# Patient Record
Sex: MALE | Race: WHITE | NOT HISPANIC OR LATINO | URBAN - METROPOLITAN AREA
[De-identification: names, ages, dates, MRNs, and addresses within clinical notes are randomized per-mention and may not be internally consistent; named-entity substitution may affect disease eponyms.]

---

## 2021-08-09 ENCOUNTER — EMERGENCY (EMERGENCY)
Facility: HOSPITAL | Age: 61
LOS: 1 days | Discharge: HOME | End: 2021-08-09
Attending: EMERGENCY MEDICINE | Admitting: EMERGENCY MEDICINE
Payer: COMMERCIAL

## 2021-08-09 VITALS
SYSTOLIC BLOOD PRESSURE: 140 MMHG | RESPIRATION RATE: 18 BRPM | DIASTOLIC BLOOD PRESSURE: 93 MMHG | OXYGEN SATURATION: 99 % | TEMPERATURE: 98 F | HEART RATE: 79 BPM

## 2021-08-09 VITALS
OXYGEN SATURATION: 99 % | WEIGHT: 175.05 LBS | HEART RATE: 92 BPM | RESPIRATION RATE: 17 BRPM | DIASTOLIC BLOOD PRESSURE: 90 MMHG | HEIGHT: 70 IN | TEMPERATURE: 98 F | SYSTOLIC BLOOD PRESSURE: 143 MMHG

## 2021-08-09 DIAGNOSIS — A09 INFECTIOUS GASTROENTERITIS AND COLITIS, UNSPECIFIED: ICD-10-CM

## 2021-08-09 DIAGNOSIS — R19.7 DIARRHEA, UNSPECIFIED: ICD-10-CM

## 2021-08-09 DIAGNOSIS — Z20.822 CONTACT WITH AND (SUSPECTED) EXPOSURE TO COVID-19: ICD-10-CM

## 2021-08-09 DIAGNOSIS — E78.5 HYPERLIPIDEMIA, UNSPECIFIED: ICD-10-CM

## 2021-08-09 LAB
FLUAV H1 2009 PAND RNA SPEC QL NAA+PROBE: SIGNIFICANT CHANGE UP
FLUAV H1 RNA SPEC QL NAA+PROBE: SIGNIFICANT CHANGE UP
FLUAV H3 RNA SPEC QL NAA+PROBE: SIGNIFICANT CHANGE UP
FLUAV SUBTYP SPEC NAA+PROBE: SIGNIFICANT CHANGE UP
FLUBV RNA SPEC QL NAA+PROBE: SIGNIFICANT CHANGE UP
RAPID RVP RESULT: SIGNIFICANT CHANGE UP
SARS-COV-2 RNA SPEC QL NAA+PROBE: SIGNIFICANT CHANGE UP

## 2021-08-09 PROCEDURE — 99284 EMERGENCY DEPT VISIT MOD MDM: CPT

## 2021-08-09 RX ORDER — METRONIDAZOLE 500 MG
1 TABLET ORAL
Qty: 15 | Refills: 0
Start: 2021-08-09 | End: 2021-08-13

## 2021-08-09 RX ORDER — AZITHROMYCIN 500 MG/1
500 TABLET, FILM COATED ORAL ONCE
Refills: 0 | Status: COMPLETED | OUTPATIENT
Start: 2021-08-09 | End: 2021-08-09

## 2021-08-09 RX ORDER — TINIDAZOLE 500 MG
4 TABLET ORAL
Qty: 4 | Refills: 0
Start: 2021-08-09

## 2021-08-09 RX ORDER — AZITHROMYCIN 500 MG/1
2 TABLET, FILM COATED ORAL
Qty: 8 | Refills: 0
Start: 2021-08-09 | End: 2021-08-12

## 2021-08-09 RX ORDER — METRONIDAZOLE 500 MG
500 TABLET ORAL ONCE
Refills: 0 | Status: COMPLETED | OUTPATIENT
Start: 2021-08-09 | End: 2021-08-09

## 2021-08-09 RX ORDER — SACCHAROMYCES BOULARDII 250 MG
1 POWDER IN PACKET (EA) ORAL
Qty: 30 | Refills: 0
Start: 2021-08-09 | End: 2021-09-07

## 2021-08-09 RX ADMIN — Medication 500 MILLIGRAM(S): at 16:44

## 2021-08-09 RX ADMIN — AZITHROMYCIN 500 MILLIGRAM(S): 500 TABLET, FILM COATED ORAL at 16:43

## 2021-08-09 NOTE — ED ADULT TRIAGE NOTE - CHIEF COMPLAINT QUOTE
Pt walked into ed c.o intermittent diarrhea and bloating x 4 days. Denies blood in stool,fever at this time.

## 2021-08-09 NOTE — ED PROVIDER NOTE - NSFOLLOWUPINSTRUCTIONS_ED_ALL_ED_FT
Diarrhea (bacterial vs. parasitic vs. viral)     Please start the antibiotics, we may stop them as the test results come in.     Start a probiotic and follow bland, easy to digest diet.     Diarrhea is frequent loose or watery bowel movements that has many causes. Diarrhea can make you feel weak and cause you to become dehydrated. Diarrhea typically lasts 2–3 days, but can last longer if it is a sign of something more serious. Drink clear fluids to prevent dehydration. Eat bland, easy-to-digest foods as tolerated.     SEEK IMMEDIATE MEDICAL CARE IF YOU HAVE ANY OF THE FOLLOWING SYMPTOMS: high fevers, lightheadedness/dizziness, chest pain, black or bloody stools, shortness of breath, severe abdominal or back pain, or any signs of dehydration.

## 2021-08-09 NOTE — ED PROVIDER NOTE - PATIENT PORTAL LINK FT
You can access the FollowMyHealth Patient Portal offered by [facility] by registering at the following website: [email or web address]. By joining [facility]’s FollowMyHealth portal, you will also be able to view your health information using other applications (apps) compatible with our system.

## 2021-08-09 NOTE — ED POST DISCHARGE NOTE - DETAILS
Patient symptom free + blastocystis hominis. 8/14/21 @ 12:32 pm 8/14/21 @ 1:28 pm Pt called back and informed of result.  He is no longer symptomatic.

## 2021-08-09 NOTE — ED PROVIDER NOTE - CLINICAL SUMMARY MEDICAL DECISION MAKING FREE TEXT BOX
Patient presenting with diarrhea x 3 days. Seems infectious. Will send stool studies as well as RVP as rhinovirus/enterovirus is also circulating. Anticipate discharge on Azithromycin and Flagyl with antibiotics to be deescalated depending on PCR results. Patient is well hydrated and does not need labs, IV fluids, or CT at this time.

## 2021-08-09 NOTE — ED PROVIDER NOTE - OBJECTIVE STATEMENT
62 y/o male presents to the ED with complaints of 3 days of watery diarrhea a few times a day associated with bloating and a grumbling stomach. Symptoms started at approximately 3:0am early Saturday morning. Patient had gone out for dinner with other people and was the only person who ate eggplant parmigiana at the restaurant. Finished eating around 11:30pm, and symptoms started a few hours later. 62 y/o male presents to the ED with complaints of 3 days of watery diarrhea a few times a day associated with bloating and a grumbling stomach. Symptoms started at approximately 3:00am early Saturday morning. Patient had gone out for dinner with other people and was the only person who ate eggplant parmigiana at the restaurant. Finished eating around 11:30pm, and symptoms started a few hours later. Denies fever, chills, N/V, or abdominal pain. Patient called his GI doctor who advised to come to the ED due to his history of diverticulosis. Patient and his  are both monogamous. Patient's partner does not have any symptoms. Both had COVID last year and are fully vaccinated. No sick contacts. No other suspicious food history. Has not taken anything for symptoms. Patient has been keeping up with his hydration and eating and is following a bland diet.

## 2021-08-14 ENCOUNTER — TRANSCRIPTION ENCOUNTER (OUTPATIENT)
Age: 61
End: 2021-08-14

## 2021-08-14 LAB
CULTURE RESULTS: SIGNIFICANT CHANGE UP
SPECIMEN SOURCE: SIGNIFICANT CHANGE UP

## 2023-07-13 ENCOUNTER — TRANSCRIPTION ENCOUNTER (OUTPATIENT)
Age: 63
End: 2023-07-13

## 2023-07-13 ENCOUNTER — INPATIENT (INPATIENT)
Facility: HOSPITAL | Age: 63
LOS: 0 days | Discharge: HOME | DRG: 346 | End: 2023-07-14
Attending: STUDENT IN AN ORGANIZED HEALTH CARE EDUCATION/TRAINING PROGRAM | Admitting: STUDENT IN AN ORGANIZED HEALTH CARE EDUCATION/TRAINING PROGRAM
Payer: COMMERCIAL

## 2023-07-13 ENCOUNTER — EMERGENCY (EMERGENCY)
Facility: HOSPITAL | Age: 63
LOS: 1 days | Discharge: ANOTHER HOSPITAL | End: 2023-07-13
Attending: EMERGENCY MEDICINE | Admitting: EMERGENCY MEDICINE
Payer: COMMERCIAL

## 2023-07-13 VITALS
RESPIRATION RATE: 18 BRPM | HEIGHT: 70 IN | DIASTOLIC BLOOD PRESSURE: 88 MMHG | WEIGHT: 175.05 LBS | HEART RATE: 89 BPM | TEMPERATURE: 100 F | OXYGEN SATURATION: 98 % | SYSTOLIC BLOOD PRESSURE: 142 MMHG

## 2023-07-13 VITALS
HEIGHT: 70 IN | SYSTOLIC BLOOD PRESSURE: 145 MMHG | TEMPERATURE: 100 F | DIASTOLIC BLOOD PRESSURE: 94 MMHG | HEART RATE: 92 BPM | OXYGEN SATURATION: 99 % | WEIGHT: 175.05 LBS | RESPIRATION RATE: 18 BRPM

## 2023-07-13 VITALS
SYSTOLIC BLOOD PRESSURE: 172 MMHG | OXYGEN SATURATION: 97 % | HEART RATE: 90 BPM | TEMPERATURE: 100 F | RESPIRATION RATE: 18 BRPM | DIASTOLIC BLOOD PRESSURE: 85 MMHG

## 2023-07-13 DIAGNOSIS — Z98.890 OTHER SPECIFIED POSTPROCEDURAL STATES: Chronic | ICD-10-CM

## 2023-07-13 DIAGNOSIS — K60.3 ANAL FISTULA: Chronic | ICD-10-CM

## 2023-07-13 LAB
ALBUMIN SERPL ELPH-MCNC: 3.4 G/DL — SIGNIFICANT CHANGE UP (ref 3.4–5)
ALP SERPL-CCNC: 56 U/L — SIGNIFICANT CHANGE UP (ref 40–120)
ALT FLD-CCNC: 30 U/L — SIGNIFICANT CHANGE UP (ref 12–42)
ANION GAP SERPL CALC-SCNC: 10 MMOL/L — SIGNIFICANT CHANGE UP (ref 9–16)
APPEARANCE UR: CLEAR — SIGNIFICANT CHANGE UP
AST SERPL-CCNC: 24 U/L — SIGNIFICANT CHANGE UP (ref 15–37)
BASOPHILS # BLD AUTO: 0.03 K/UL — SIGNIFICANT CHANGE UP (ref 0–0.2)
BASOPHILS NFR BLD AUTO: 0.3 % — SIGNIFICANT CHANGE UP (ref 0–2)
BILIRUB SERPL-MCNC: 0.4 MG/DL — SIGNIFICANT CHANGE UP (ref 0.2–1.2)
BILIRUB UR-MCNC: NEGATIVE — SIGNIFICANT CHANGE UP
BUN SERPL-MCNC: 16 MG/DL — SIGNIFICANT CHANGE UP (ref 7–23)
CALCIUM SERPL-MCNC: 9.1 MG/DL — SIGNIFICANT CHANGE UP (ref 8.5–10.5)
CHLORIDE SERPL-SCNC: 102 MMOL/L — SIGNIFICANT CHANGE UP (ref 96–108)
CO2 SERPL-SCNC: 26 MMOL/L — SIGNIFICANT CHANGE UP (ref 22–31)
COLOR SPEC: YELLOW — SIGNIFICANT CHANGE UP
CREAT SERPL-MCNC: 0.93 MG/DL — SIGNIFICANT CHANGE UP (ref 0.5–1.3)
CRP SERPL-MCNC: 11 MG/L — HIGH (ref 0–9)
DIFF PNL FLD: NEGATIVE — SIGNIFICANT CHANGE UP
EGFR: 93 ML/MIN/1.73M2 — SIGNIFICANT CHANGE UP
EOSINOPHIL # BLD AUTO: 0.17 K/UL — SIGNIFICANT CHANGE UP (ref 0–0.5)
EOSINOPHIL NFR BLD AUTO: 1.6 % — SIGNIFICANT CHANGE UP (ref 0–6)
GLUCOSE SERPL-MCNC: 111 MG/DL — HIGH (ref 70–99)
GLUCOSE UR QL: NEGATIVE MG/DL — SIGNIFICANT CHANGE UP
HCT VFR BLD CALC: 39 % — SIGNIFICANT CHANGE UP (ref 39–50)
HGB BLD-MCNC: 13 G/DL — SIGNIFICANT CHANGE UP (ref 13–17)
IMM GRANULOCYTES NFR BLD AUTO: 0.5 % — SIGNIFICANT CHANGE UP (ref 0–0.9)
KETONES UR-MCNC: ABNORMAL MG/DL
LACTATE BLDV-MCNC: 0.6 MMOL/L — SIGNIFICANT CHANGE UP (ref 0.5–2)
LEUKOCYTE ESTERASE UR-ACNC: NEGATIVE — SIGNIFICANT CHANGE UP
LYMPHOCYTES # BLD AUTO: 1.41 K/UL — SIGNIFICANT CHANGE UP (ref 1–3.3)
LYMPHOCYTES # BLD AUTO: 13.1 % — SIGNIFICANT CHANGE UP (ref 13–44)
MCHC RBC-ENTMCNC: 31.4 PG — SIGNIFICANT CHANGE UP (ref 27–34)
MCHC RBC-ENTMCNC: 33.3 GM/DL — SIGNIFICANT CHANGE UP (ref 32–36)
MCV RBC AUTO: 94.2 FL — SIGNIFICANT CHANGE UP (ref 80–100)
MONOCYTES # BLD AUTO: 0.79 K/UL — SIGNIFICANT CHANGE UP (ref 0–0.9)
MONOCYTES NFR BLD AUTO: 7.3 % — SIGNIFICANT CHANGE UP (ref 2–14)
NEUTROPHILS # BLD AUTO: 8.34 K/UL — HIGH (ref 1.8–7.4)
NEUTROPHILS NFR BLD AUTO: 77.2 % — HIGH (ref 43–77)
NITRITE UR-MCNC: NEGATIVE — SIGNIFICANT CHANGE UP
NRBC # BLD: 0 /100 WBCS — SIGNIFICANT CHANGE UP (ref 0–0)
PH UR: 6.5 — SIGNIFICANT CHANGE UP (ref 5–8)
PLATELET # BLD AUTO: 182 K/UL — SIGNIFICANT CHANGE UP (ref 150–400)
POTASSIUM SERPL-MCNC: 4.1 MMOL/L — SIGNIFICANT CHANGE UP (ref 3.5–5.3)
POTASSIUM SERPL-SCNC: 4.1 MMOL/L — SIGNIFICANT CHANGE UP (ref 3.5–5.3)
PROT SERPL-MCNC: 7.2 G/DL — SIGNIFICANT CHANGE UP (ref 6.4–8.2)
PROT UR-MCNC: NEGATIVE MG/DL — SIGNIFICANT CHANGE UP
RBC # BLD: 4.14 M/UL — LOW (ref 4.2–5.8)
RBC # FLD: 12.4 % — SIGNIFICANT CHANGE UP (ref 10.3–14.5)
SODIUM SERPL-SCNC: 138 MMOL/L — SIGNIFICANT CHANGE UP (ref 132–145)
SP GR SPEC: 1.02 — SIGNIFICANT CHANGE UP (ref 1–1.03)
UROBILINOGEN FLD QL: 1 MG/DL — SIGNIFICANT CHANGE UP (ref 0.2–1)
WBC # BLD: 10.79 K/UL — HIGH (ref 3.8–10.5)
WBC # FLD AUTO: 10.79 K/UL — HIGH (ref 3.8–10.5)

## 2023-07-13 PROCEDURE — 99285 EMERGENCY DEPT VISIT HI MDM: CPT

## 2023-07-13 PROCEDURE — 99221 1ST HOSP IP/OBS SF/LOW 40: CPT | Mod: 25

## 2023-07-13 PROCEDURE — 74177 CT ABD & PELVIS W/CONTRAST: CPT | Mod: 26

## 2023-07-13 RX ORDER — PIPERACILLIN AND TAZOBACTAM 4; .5 G/20ML; G/20ML
3.38 INJECTION, POWDER, LYOPHILIZED, FOR SOLUTION INTRAVENOUS ONCE
Refills: 0 | Status: COMPLETED | OUTPATIENT
Start: 2023-07-13 | End: 2023-07-13

## 2023-07-13 RX ORDER — AMPICILLIN SODIUM AND SULBACTAM SODIUM 250; 125 MG/ML; MG/ML
1.5 INJECTION, POWDER, FOR SUSPENSION INTRAMUSCULAR; INTRAVENOUS ONCE
Refills: 0 | Status: COMPLETED | OUTPATIENT
Start: 2023-07-13 | End: 2023-07-14

## 2023-07-13 RX ORDER — AMPICILLIN SODIUM AND SULBACTAM SODIUM 250; 125 MG/ML; MG/ML
1.5 INJECTION, POWDER, FOR SUSPENSION INTRAMUSCULAR; INTRAVENOUS EVERY 6 HOURS
Refills: 0 | Status: DISCONTINUED | OUTPATIENT
Start: 2023-07-14 | End: 2023-07-14

## 2023-07-13 RX ORDER — ONDANSETRON 8 MG/1
4 TABLET, FILM COATED ORAL EVERY 6 HOURS
Refills: 0 | Status: DISCONTINUED | OUTPATIENT
Start: 2023-07-13 | End: 2023-07-14

## 2023-07-13 RX ORDER — KETOROLAC TROMETHAMINE 30 MG/ML
15 SYRINGE (ML) INJECTION ONCE
Refills: 0 | Status: DISCONTINUED | OUTPATIENT
Start: 2023-07-13 | End: 2023-07-13

## 2023-07-13 RX ORDER — MORPHINE SULFATE 50 MG/1
4 CAPSULE, EXTENDED RELEASE ORAL ONCE
Refills: 0 | Status: DISCONTINUED | OUTPATIENT
Start: 2023-07-13 | End: 2023-07-13

## 2023-07-13 RX ORDER — HYDROMORPHONE HYDROCHLORIDE 2 MG/ML
0.25 INJECTION INTRAMUSCULAR; INTRAVENOUS; SUBCUTANEOUS EVERY 6 HOURS
Refills: 0 | Status: DISCONTINUED | OUTPATIENT
Start: 2023-07-13 | End: 2023-07-14

## 2023-07-13 RX ORDER — ACETAMINOPHEN 500 MG
1000 TABLET ORAL ONCE
Refills: 0 | Status: COMPLETED | OUTPATIENT
Start: 2023-07-13 | End: 2023-07-14

## 2023-07-13 RX ORDER — AMPICILLIN SODIUM AND SULBACTAM SODIUM 250; 125 MG/ML; MG/ML
INJECTION, POWDER, FOR SUSPENSION INTRAMUSCULAR; INTRAVENOUS
Refills: 0 | Status: DISCONTINUED | OUTPATIENT
Start: 2023-07-14 | End: 2023-07-14

## 2023-07-13 RX ORDER — SODIUM CHLORIDE 9 MG/ML
1000 INJECTION, SOLUTION INTRAVENOUS
Refills: 0 | Status: DISCONTINUED | OUTPATIENT
Start: 2023-07-13 | End: 2023-07-14

## 2023-07-13 RX ORDER — HYDROMORPHONE HYDROCHLORIDE 2 MG/ML
0.5 INJECTION INTRAMUSCULAR; INTRAVENOUS; SUBCUTANEOUS ONCE
Refills: 0 | Status: DISCONTINUED | OUTPATIENT
Start: 2023-07-13 | End: 2023-07-14

## 2023-07-13 RX ORDER — HEPARIN SODIUM 5000 [USP'U]/ML
5000 INJECTION INTRAVENOUS; SUBCUTANEOUS EVERY 8 HOURS
Refills: 0 | Status: DISCONTINUED | OUTPATIENT
Start: 2023-07-13 | End: 2023-07-14

## 2023-07-13 RX ADMIN — Medication 15 MILLIGRAM(S): at 17:06

## 2023-07-13 RX ADMIN — MORPHINE SULFATE 4 MILLIGRAM(S): 50 CAPSULE, EXTENDED RELEASE ORAL at 20:55

## 2023-07-13 RX ADMIN — PIPERACILLIN AND TAZOBACTAM 200 GRAM(S): 4; .5 INJECTION, POWDER, LYOPHILIZED, FOR SOLUTION INTRAVENOUS at 19:32

## 2023-07-13 NOTE — ED ADULT TRIAGE NOTE - TEMPERATURE IN CELSIUS (DEGREES C)
Follow up with your doctor  Motrin or tylenol for pain  Return with worsening symptoms or concerns  
37.6

## 2023-07-13 NOTE — ED ADULT TRIAGE NOTE - CHIEF COMPLAINT QUOTE
transfer from [facility]  with rectal pain and fever for 3 days; ; sent for eval of proctitis vs perirectal abscess

## 2023-07-13 NOTE — H&P ADULT - HISTORY OF PRESENT ILLNESS
64yo Male pt with PMHx HTN, HLD, known external hemorrhoids, PSHx of perianal fistula s/p fistulotomy (1987), lateral internal sphincterotomy for increased anal tone? (1994), who presents with a week and a half long hx of rectal pain and discharge.     Patient reports that over the last 2 months, he has been feeling some discomfort with bowel movements. He didnt think much of it until a week and a half ago, some significant pain w/ defecation. At that time he noted some "cream colored" discharge w/ some spots of blood when he wiped. Approximately 2 days ago, he started feeling 10/10 rectal pain w/ bowel movements which prompted him to go to the ED today. Patient reports some mild nausea, but no vomiting, along with subjective chills.     In the ED, pt afebrile, nontachycardic, normotensive, and satting on RA. On exam, R. posterior external hemorrhoids, Left lateral side wall with equisite ttp, light bleeding when palpated, with fissure? palpated. No discrete masses or lesions palpated. Labs significant for WBC 10.72, Hb 13. CRP 11. CTAP showing proctitis with a 3.5 x 2.6 x 2.4 cm mural abscess involving the left lateral wall.    PMH: HTN, HLD, known external hemorrhoids  PSHx: perianal fistula s/p fistulotomy (1987), lateral internal sphincterotomy for increased anal tone? (1994),   Medications: losartan 10mg, crestor 5mg daily  Allergies: NKDA  Social Hx: Smokes marijuana, has 2-3 drinks/day. Doesnt smoke cigarettes.   Family Hx: Denies family hx of IBS, Crohn's, UC, or colon cancer.  Last colonoscopy: 6 yrs ago showing diverticulosis, no polyps, told to return in 10 yrs.  Last EGD: never

## 2023-07-13 NOTE — ED ADULT TRIAGE NOTE - CHIEF COMPLAINT QUOTE
Pt walked in c/o rectal pain and fevers x 3 days. PMH hemorrhoids. Denies discharge, bleeding, or any external growths. Pt sent in from UC for further evaluation.

## 2023-07-13 NOTE — ED PROVIDER NOTE - CLINICAL SUMMARY MEDICAL DECISION MAKING FREE TEXT BOX
Pt transferred from [facility] for surgery eval for perirectal abscess. W/u from [facility] reviewed. Pt declines further analgesia at this time. Surgery consult. Dispo pending surgery recommendations

## 2023-07-13 NOTE — ED PROVIDER NOTE - OBJECTIVE STATEMENT
He states he has had severe rectal pain x 3 days as well as low grade fevers.  He has a history of hemorrhoids in the past, so he He states he has had severe rectal pain x 3 days as well as low grade fevers.  He has a history of hemorrhoids in the past, so he tried using some OTC Preparation-H cream without relief.  No hx of thrombosed hemorrhoids or surgery.  Remote (~35 years ago) hx of anal sphincterotomy and was prescribed mesalamine PRN in the past, but no official diagnosis of proctitis or any history of perirectal abscesses or fistulas.  Denies any recent rectal intercourse or FB insertion.  BMs have been normal-to-loose without hematochezia or melena.  Denies any abdominal cramping, nausea, or vomiting.  Denies any urinary symptoms.

## 2023-07-13 NOTE — ED PROVIDER NOTE - PHYSICAL EXAMINATION
Constitutional: Well appearing, awake, alert, oriented to person, place, time/situation and in no apparent distress.  ENMT: Airway patent.   Eyes: Clear bilaterally  Cardiac: Normal rate, regular rhythm.   Respiratory: No increased WOB, tachypnea, hypoxia, or accessory mm use. Pt speaks in full sentences.   Gastrointestinal: Abd soft, NT, ND. No guarding, rebound, or rigidity.   Rectal deferred to surgery  Musculoskeletal: Range of motion is not limited  Neuro: Alert and oriented x 3, face symmetric and speech fluent. Nml gross motor movement, grossly non focal   Skin: Skin normal color for race, warm, dry and intact. No evidence of rash.  Psych: Alert and oriented to person, place, time/situation. normal mood and affect. no apparent risk to self or others.

## 2023-07-13 NOTE — H&P ADULT - NSHPADDITIONALINFOADULT_GEN_ALL_CORE
****Chief Addendum****    Agree w above A/P. 64yo M, PMx ext hemorrhoids, PSx fistulotomy (87), lateral internal sphincterotomy (94), who presented to [facility] w several days of perirectal pain and associated mucopurulent discharge while defecating. Inc rectal pain over last 48hrs, prompting ER visit. Last cscope 6yrs ago reportedly w/nl.  ER to ER Transfer. Upon arrival to [facility] ER, VS w/nl, afebrile, benign abd exam, RENATE notable for ext hemorrhoids, appropriate sphincter tone, left lateral wall TTP, friable mucosa w bloody discharge on finger. WBC 10.8, CT A/P w proctitis and 3.5x2.6x2.4cm mural wall abscess 4.5cm from anal verge. Will admit for EUA, +/- drainage procedure in AM. NPO, cont IVF resuscitation, IV Abx. Discussed w attending CRS surgeon.

## 2023-07-13 NOTE — ED ADULT NURSE NOTE - NSFALLUNIVINTERV_ED_ALL_ED
Bed/Stretcher in lowest position, wheels locked, appropriate side rails in place/Call bell, personal items and telephone in reach/Instruct patient to call for assistance before getting out of bed/chair/stretcher/Non-slip footwear applied when patient is off stretcher/[place] to call system/Physically safe environment - no spills, clutter or unnecessary equipment/Purposeful proactive rounding/Room/bathroom lighting operational, light cord in reach

## 2023-07-13 NOTE — ED PROVIDER NOTE - GASTROINTESTINAL, MLM
Abdomen soft, non-tender, no guarding.  no obvious external hemorrhoids.  Markedly tender over left lateral rectal wall without distinct mass or abscess.  No purulent drainage.

## 2023-07-13 NOTE — H&P ADULT - NSHPPHYSICALEXAM_GEN_ALL_CORE
Physical Exam  General: AAOx3, NAD, laying comfortably in bed  Cardio: RRR.  Pulm: Nonlabored breathing.   Abdomen: soft, NTND.  Rectal: R. posterior external hemorrhoids, Left lateral side wall with equisite ttp, light bleeding when palpated, with fissure? palpated. No discrete masses or lesions palpated.  Extremities: WWP, peripheral pulses appreciated.

## 2023-07-13 NOTE — ED PROVIDER NOTE - NSICDXPASTMEDICALHX_GEN_ALL_CORE_FT
PAST MEDICAL HISTORY:  Alopecia, male pattern     Diverticulosis     Hyperlipidemia     Hypertension

## 2023-07-13 NOTE — ED PROVIDER NOTE - NSRECPHYSICIAN_ED_A_ED_FT
Dr. Jeff Alford (Surgery) & ED Attending (Transfer Center notified) Dr. Jeff Alford (Surgery) & Dr. Mindy Henson (ED Attending)

## 2023-07-13 NOTE — ED ADULT NURSE NOTE - OBJECTIVE STATEMENT
Pt. 63y M pmhx HTN, HLD transferred  from [facility]  with rectal pain, fever, and malaise for 3 days; ; sent for eval of proctitis vs perirectal abscess. Denies SOB, CP, n/v/d, urinary symptoms.

## 2023-07-13 NOTE — ED ADULT NURSE NOTE - ED STAT RN HANDOFF DETAILS
Covering for RN Adreyeva: Patient stable for transfer. Transfer and plan of care discussed with patient and verbalized understanding. No acute distress noted. Safety precautions maintained. Belongings secured with patient.

## 2023-07-13 NOTE — ED ADULT NURSE NOTE - NSFALLUNIVINTERV_ED_ALL_ED
Bed/Stretcher in lowest position, wheels locked, appropriate side rails in place/Call bell, personal items and telephone in reach/Instruct patient to call for assistance before getting out of bed/chair/stretcher/Non-slip footwear applied when patient is off stretcher/[place] to call system/Physically safe environment - no spills, clutter or unnecessary equipment/Purposeful proactive rounding/Room/bathroom lighting operational, light cord in reach

## 2023-07-13 NOTE — ED ADULT NURSE NOTE - CHIEF COMPLAINT QUOTE
transfer from [facility]  with rectal pain and fever for 3 days; ; sent for eval of proctitis vs perirectal abscess

## 2023-07-13 NOTE — H&P ADULT - NSHPLABSRESULTS_GEN_ALL_CORE
LABS:                        13.0   10.79 )-----------( 182      ( 13 Jul 2023 16:54 )             39.0     07-13    138  |  102  |  16  ----------------------------<  111<H>  4.1   |  26  |  0.93    Ca    9.1      13 Jul 2023 16:54    TPro  7.2  /  Alb  3.4  /  TBili  0.4  /  DBili  x   /  AST  24  /  ALT  30  /  AlkPhos  56  07-13        CT Abdomen and Pelvis w/ IV Cont:   ACC: [id number] EXAM:  CT ABDOMEN AND PELVIS IC   ORDERED BY: HOLLI FLORES     PROCEDURE DATE:  07/13/2023          INTERPRETATION:  CONTRAST/COMPLICATIONS:  IV Contrast: Omnipaque 350  95 cc administered   5 cc discarded  Oral Contrast: None  Complications: None    CT of the ABDOMEN and PELVIS with intravenous contrast dated 7/13/2023   6:53 PM    INDICATION: Rectal pain - R/O chalo-rectal abscess vs proctitis    TECHNIQUE: CT of the abdomen and pelvis was performed. Axial and coronal    andsagittall images were produced and reviewed..    PRIOR STUDIES: None.    FINDINGS: Images of the lower chest demonstrate small probably   paraesophageal  hiatal hernia.    The liver is normal in appearance.      No radiopaque stones are seen in the gallbladder.    The pancreas is normal in appearance.     No splenic abnormalities are seen.    The adrenal glands are unremarkable.    The kidneys are normal in size. No hydronephrosis. Trace bilateral   perinephric fluid. No nephrolithiasis..    No abdominal aortic aneurysm is seen.     No lymphadenopathy is seen.    Evaluation of the bowel is limited due to lack of oral contrast material.   No bowel obstruction or ileus. Normal appendix. Colonic diverticulosis   particularly involving the sigmoid.. There is wall thickening with   mucosal enhancement and submucosal edema of the entire rectum with   mesocolic fat infiltration. Presacral edema. There is a 2.4 x 2.6 x 3.5   cm mural abscess involving the left lateral aspect junction of mid third  and lower third rectum. The inferior aspect of the abscess is 4.5 cm   distal to the anal verge.       No ascites is seen.    Images of the pelvis demonstrate the prostate and seminal vesicles to be   normal in appearance. The prostate measures 4.9x 2.6 x 3.5 cm.    Unremarkable bladder.    No pelvic or groin adenopathy.    Evaluation of the osseous structures demonstrates no significant   abnormality.      IMPRESSION:  Proctitis with a 3.5 x 2.6 x 2.4 cm mural abscess involving the left   lateral wall.        --- End of Report ---            MAYA CASEY MD; Attending Radiologist  This document has been electronically signed. Jul 13 2023  6:56PM (07-13-23 @ 18:53)

## 2023-07-13 NOTE — ED PROVIDER NOTE - CLINICAL SUMMARY MEDICAL DECISION MAKING FREE TEXT BOX
Pt with rectal pain & fevers x 3 days.  Rectal wall tenderness on exam with tracely elevated WBC.  Lactate WNL and afebrile.  No signs of sepsis.  CT reveals proctitis with intramural abscess.  D/W Dr. Jeff Alford (on call for general surgery) who recommended ED-to-ED transfer.  They will evaluate patient for bedside drainage & discharge vs I&D in OR vs drainage/admission for IV antibiotics.  Patient is agreeable to this plan & will be kept NPO.  EMS to transfer to [facility] as ED-to-ED transfer (Tier 2) Pt with rectal pain & fevers x 3 days.  Rectal wall tenderness on exam with tracely elevated WBC.  Lactate WNL and afebrile.  No signs of sepsis.  CT reveals proctitis with intramural abscess.  Treated with Zosyn 3.375 gm IV.  Pain improved markedly after one dose of IV Toradol.  D/W Dr. Jeff Alford (on call for general surgery) who recommended ED-to-ED transfer.  They will evaluate patient for bedside drainage & discharge vs I&D in OR vs drainage/admission for IV antibiotics.  Patient is agreeable to this plan & will be kept NPO.  EMS to transfer to [facility] as ED-to-ED transfer (Tier 2)

## 2023-07-13 NOTE — ED PROVIDER NOTE - OBJECTIVE STATEMENT
Pt w/ PMHx PMHx HTN, HLD, known external hemorrhoids, PSHx of perianal fistula s/p fistulotomy (1987), lateral internal sphincterotomy for increased anal tone? (1994), who presents to [facility] with a week and a half long hx of rectal pain and discharge. Pt CT showing perirectal abscess, transferred here for surgery I&D and eval. Received Toradol, Morphine, Zosyn PTA    A/p [facility] HPI-  He states he has had severe rectal pain x 3 days as well as low grade fevers.  He has a history of hemorrhoids in the past, so he tried using some OTC Preparation-H cream without relief.  No hx of thrombosed hemorrhoids or surgery.  Remote (~35 years ago) hx of anal sphincterotomy and was prescribed mesalamine PRN in the past, but no official diagnosis of proctitis or any history of perirectal abscesses or fistulas.  Denies any recent rectal intercourse or FB insertion.  BMs have been normal-to-loose without hematochezia or melena.  Denies any abdominal cramping, nausea, or vomiting.  Denies any urinary symptoms.

## 2023-07-13 NOTE — ED ADULT NURSE NOTE - OBJECTIVE STATEMENT
pt aox3 with steady gait. Pt walked in c/o rectal pain and fevers x 3 days. PMH hemorrhoids. Denies discharge, bleeding, or any external growths. Pt sent in from  for further evaluation. denies n/v/d

## 2023-07-13 NOTE — ED PROVIDER NOTE - PROGRESS NOTE DETAILS
Surgery consulted and will see the pt I&D performed by surgery. Admit to surgery, [facility] Surgery unable to perform bedside I&D. Admit to surgery, regional, [facility] pt likely for OR tomorrow

## 2023-07-13 NOTE — ED PROVIDER NOTE - NSICDXPASTMEDICALHX_GEN_ALL_CORE_FT
PAST MEDICAL HISTORY:  Alopecia, male pattern     Diverticulosis     Hyperlipidemia      PAST MEDICAL HISTORY:  Alopecia, male pattern     Diverticulosis     Hyperlipidemia     Hypertension

## 2023-07-13 NOTE — H&P ADULT - ASSESSMENT
62yo Male pt with PMHx HTN, HLD, known external hemorrhoids, PSHx of perianal fistula s/p fistulotomy (1987), lateral internal sphincterotomy for increased anal tone? (1994), who presents with a week and a half long hx of rectal pain and discharge. In the ED, VS wnl. On exam, R. posterior external hemorrhoids, Left lateral side wall with equisite ttp, light bleeding when palpated, with fissure? palpated. No discrete masses or lesions palpated. Labs significant for WBC 10.72, Hb 13. CRP 11. CTAP showing proctitis with a 3.5 x 2.6 x 2.4 cm mural abscess involving the left lateral wall. Findings c/w left lateral side wall perirectal abscess.     Plan:  - Admit Dr. Coronel  - NPO/IVF  - pain/nausea control prn  - Unasyn IV  - Add on for EUA, possible I&D  - SQH, SCDs  - AM labs, preop

## 2023-07-14 ENCOUNTER — TRANSCRIPTION ENCOUNTER (OUTPATIENT)
Age: 63
End: 2023-07-14

## 2023-07-14 VITALS
SYSTOLIC BLOOD PRESSURE: 155 MMHG | DIASTOLIC BLOOD PRESSURE: 83 MMHG | HEART RATE: 89 BPM | TEMPERATURE: 99 F | OXYGEN SATURATION: 94 % | RESPIRATION RATE: 18 BRPM

## 2023-07-14 PROBLEM — K57.90 DIVERTICULOSIS OF INTESTINE, PART UNSPECIFIED, WITHOUT PERFORATION OR ABSCESS WITHOUT BLEEDING: Chronic | Status: ACTIVE | Noted: 2021-08-09

## 2023-07-14 LAB
ANION GAP SERPL CALC-SCNC: 10 MMOL/L — SIGNIFICANT CHANGE UP (ref 5–17)
ANION GAP SERPL CALC-SCNC: 11 MMOL/L — SIGNIFICANT CHANGE UP (ref 5–17)
APTT BLD: 40.3 SEC — HIGH (ref 27.5–35.5)
BLD GP AB SCN SERPL QL: NEGATIVE — SIGNIFICANT CHANGE UP
BUN SERPL-MCNC: 13 MG/DL — SIGNIFICANT CHANGE UP (ref 7–23)
BUN SERPL-MCNC: 15 MG/DL — SIGNIFICANT CHANGE UP (ref 7–23)
CALCIUM SERPL-MCNC: 8.8 MG/DL — SIGNIFICANT CHANGE UP (ref 8.4–10.5)
CALCIUM SERPL-MCNC: 9.2 MG/DL — SIGNIFICANT CHANGE UP (ref 8.4–10.5)
CHLORIDE SERPL-SCNC: 103 MMOL/L — SIGNIFICANT CHANGE UP (ref 96–108)
CHLORIDE SERPL-SCNC: 103 MMOL/L — SIGNIFICANT CHANGE UP (ref 96–108)
CO2 SERPL-SCNC: 24 MMOL/L — SIGNIFICANT CHANGE UP (ref 22–31)
CO2 SERPL-SCNC: 26 MMOL/L — SIGNIFICANT CHANGE UP (ref 22–31)
CREAT SERPL-MCNC: 0.87 MG/DL — SIGNIFICANT CHANGE UP (ref 0.5–1.3)
CREAT SERPL-MCNC: 1.04 MG/DL — SIGNIFICANT CHANGE UP (ref 0.5–1.3)
EGFR: 82 ML/MIN/1.73M2 — SIGNIFICANT CHANGE UP
EGFR: 98 ML/MIN/1.73M2 — SIGNIFICANT CHANGE UP
GLUCOSE SERPL-MCNC: 90 MG/DL — SIGNIFICANT CHANGE UP (ref 70–99)
GLUCOSE SERPL-MCNC: 99 MG/DL — SIGNIFICANT CHANGE UP (ref 70–99)
GRAM STN FLD: SIGNIFICANT CHANGE UP
HCT VFR BLD CALC: 36.2 % — LOW (ref 39–50)
HCT VFR BLD CALC: 39.5 % — SIGNIFICANT CHANGE UP (ref 39–50)
HGB BLD-MCNC: 12.3 G/DL — LOW (ref 13–17)
HGB BLD-MCNC: 13 G/DL — SIGNIFICANT CHANGE UP (ref 13–17)
INR BLD: 1.16 — SIGNIFICANT CHANGE UP (ref 0.88–1.16)
MAGNESIUM SERPL-MCNC: 2 MG/DL — SIGNIFICANT CHANGE UP (ref 1.6–2.6)
MAGNESIUM SERPL-MCNC: 2.1 MG/DL — SIGNIFICANT CHANGE UP (ref 1.6–2.6)
MCHC RBC-ENTMCNC: 31 PG — SIGNIFICANT CHANGE UP (ref 27–34)
MCHC RBC-ENTMCNC: 31.6 PG — SIGNIFICANT CHANGE UP (ref 27–34)
MCHC RBC-ENTMCNC: 32.9 GM/DL — SIGNIFICANT CHANGE UP (ref 32–36)
MCHC RBC-ENTMCNC: 34 GM/DL — SIGNIFICANT CHANGE UP (ref 32–36)
MCV RBC AUTO: 93.1 FL — SIGNIFICANT CHANGE UP (ref 80–100)
MCV RBC AUTO: 94 FL — SIGNIFICANT CHANGE UP (ref 80–100)
NRBC # BLD: 0 /100 WBCS — SIGNIFICANT CHANGE UP (ref 0–0)
NRBC # BLD: 0 /100 WBCS — SIGNIFICANT CHANGE UP (ref 0–0)
PHOSPHATE SERPL-MCNC: 3.4 MG/DL — SIGNIFICANT CHANGE UP (ref 2.5–4.5)
PHOSPHATE SERPL-MCNC: 3.7 MG/DL — SIGNIFICANT CHANGE UP (ref 2.5–4.5)
PLATELET # BLD AUTO: 171 K/UL — SIGNIFICANT CHANGE UP (ref 150–400)
PLATELET # BLD AUTO: 177 K/UL — SIGNIFICANT CHANGE UP (ref 150–400)
POTASSIUM SERPL-MCNC: 4 MMOL/L — SIGNIFICANT CHANGE UP (ref 3.5–5.3)
POTASSIUM SERPL-MCNC: 4 MMOL/L — SIGNIFICANT CHANGE UP (ref 3.5–5.3)
POTASSIUM SERPL-SCNC: 4 MMOL/L — SIGNIFICANT CHANGE UP (ref 3.5–5.3)
POTASSIUM SERPL-SCNC: 4 MMOL/L — SIGNIFICANT CHANGE UP (ref 3.5–5.3)
PROTHROM AB SERPL-ACNC: 13.8 SEC — HIGH (ref 10.5–13.4)
RBC # BLD: 3.89 M/UL — LOW (ref 4.2–5.8)
RBC # BLD: 4.2 M/UL — SIGNIFICANT CHANGE UP (ref 4.2–5.8)
RBC # FLD: 12.4 % — SIGNIFICANT CHANGE UP (ref 10.3–14.5)
RBC # FLD: 12.4 % — SIGNIFICANT CHANGE UP (ref 10.3–14.5)
RH IG SCN BLD-IMP: POSITIVE — SIGNIFICANT CHANGE UP
RH IG SCN BLD-IMP: POSITIVE — SIGNIFICANT CHANGE UP
SODIUM SERPL-SCNC: 137 MMOL/L — SIGNIFICANT CHANGE UP (ref 135–145)
SODIUM SERPL-SCNC: 140 MMOL/L — SIGNIFICANT CHANGE UP (ref 135–145)
SPECIMEN SOURCE: SIGNIFICANT CHANGE UP
WBC # BLD: 10.39 K/UL — SIGNIFICANT CHANGE UP (ref 3.8–10.5)
WBC # BLD: 9.74 K/UL — SIGNIFICANT CHANGE UP (ref 3.8–10.5)
WBC # FLD AUTO: 10.39 K/UL — SIGNIFICANT CHANGE UP (ref 3.8–10.5)
WBC # FLD AUTO: 9.74 K/UL — SIGNIFICANT CHANGE UP (ref 3.8–10.5)

## 2023-07-14 PROCEDURE — 87186 SC STD MICRODIL/AGAR DIL: CPT

## 2023-07-14 PROCEDURE — C1889: CPT

## 2023-07-14 PROCEDURE — 86850 RBC ANTIBODY SCREEN: CPT

## 2023-07-14 PROCEDURE — 87205 SMEAR GRAM STAIN: CPT

## 2023-07-14 PROCEDURE — 99285 EMERGENCY DEPT VISIT HI MDM: CPT

## 2023-07-14 PROCEDURE — 80048 BASIC METABOLIC PNL TOTAL CA: CPT

## 2023-07-14 PROCEDURE — 46060 I&D ISCHIORECTAL/NTRMRL ABSC: CPT | Mod: GC

## 2023-07-14 PROCEDURE — 85610 PROTHROMBIN TIME: CPT

## 2023-07-14 PROCEDURE — 83735 ASSAY OF MAGNESIUM: CPT

## 2023-07-14 PROCEDURE — 85027 COMPLETE CBC AUTOMATED: CPT

## 2023-07-14 PROCEDURE — 84100 ASSAY OF PHOSPHORUS: CPT

## 2023-07-14 PROCEDURE — 87070 CULTURE OTHR SPECIMN AEROBIC: CPT

## 2023-07-14 PROCEDURE — 36415 COLL VENOUS BLD VENIPUNCTURE: CPT

## 2023-07-14 PROCEDURE — 86901 BLOOD TYPING SEROLOGIC RH(D): CPT

## 2023-07-14 PROCEDURE — 96375 TX/PRO/DX INJ NEW DRUG ADDON: CPT

## 2023-07-14 PROCEDURE — 86900 BLOOD TYPING SEROLOGIC ABO: CPT

## 2023-07-14 PROCEDURE — 85730 THROMBOPLASTIN TIME PARTIAL: CPT

## 2023-07-14 PROCEDURE — 87075 CULTR BACTERIA EXCEPT BLOOD: CPT

## 2023-07-14 PROCEDURE — 96374 THER/PROPH/DIAG INJ IV PUSH: CPT

## 2023-07-14 DEVICE — SURGICEL FIBRILLAR 4 X 4": Type: IMPLANTABLE DEVICE | Status: FUNCTIONAL

## 2023-07-14 RX ORDER — SODIUM CHLORIDE 9 MG/ML
1000 INJECTION, SOLUTION INTRAVENOUS
Refills: 0 | Status: DISCONTINUED | OUTPATIENT
Start: 2023-07-14 | End: 2023-07-14

## 2023-07-14 RX ORDER — HYDROMORPHONE HYDROCHLORIDE 2 MG/ML
0.2 INJECTION INTRAMUSCULAR; INTRAVENOUS; SUBCUTANEOUS
Refills: 0 | Status: DISCONTINUED | OUTPATIENT
Start: 2023-07-14 | End: 2023-07-14

## 2023-07-14 RX ORDER — ONDANSETRON 8 MG/1
4 TABLET, FILM COATED ORAL EVERY 8 HOURS
Refills: 0 | Status: DISCONTINUED | OUTPATIENT
Start: 2023-07-14 | End: 2023-07-14

## 2023-07-14 RX ORDER — ACETAMINOPHEN 500 MG
1000 TABLET ORAL ONCE
Refills: 0 | Status: COMPLETED | OUTPATIENT
Start: 2023-07-14 | End: 2023-07-14

## 2023-07-14 RX ORDER — DOCUSATE SODIUM 100 MG
2 CAPSULE ORAL
Qty: 18 | Refills: 0
Start: 2023-07-14 | End: 2023-07-16

## 2023-07-14 RX ORDER — ACETAMINOPHEN 500 MG
1000 TABLET ORAL ONCE
Refills: 0 | Status: DISCONTINUED | OUTPATIENT
Start: 2023-07-14 | End: 2023-07-14

## 2023-07-14 RX ORDER — OXYCODONE HYDROCHLORIDE 5 MG/1
1 TABLET ORAL
Qty: 12 | Refills: 0
Start: 2023-07-14 | End: 2023-07-16

## 2023-07-14 RX ORDER — CEFTRIAXONE 500 MG/1
2000 INJECTION, POWDER, FOR SOLUTION INTRAMUSCULAR; INTRAVENOUS ONCE
Refills: 0 | Status: COMPLETED | OUTPATIENT
Start: 2023-07-14 | End: 2023-07-14

## 2023-07-14 RX ADMIN — Medication 400 MILLIGRAM(S): at 00:40

## 2023-07-14 RX ADMIN — HEPARIN SODIUM 5000 UNIT(S): 5000 INJECTION INTRAVENOUS; SUBCUTANEOUS at 05:39

## 2023-07-14 RX ADMIN — AMPICILLIN SODIUM AND SULBACTAM SODIUM 100 GRAM(S): 250; 125 INJECTION, POWDER, FOR SUSPENSION INTRAMUSCULAR; INTRAVENOUS at 11:52

## 2023-07-14 RX ADMIN — SODIUM CHLORIDE 120 MILLILITER(S): 9 INJECTION, SOLUTION INTRAVENOUS at 01:10

## 2023-07-14 RX ADMIN — AMPICILLIN SODIUM AND SULBACTAM SODIUM 100 GRAM(S): 250; 125 INJECTION, POWDER, FOR SUSPENSION INTRAMUSCULAR; INTRAVENOUS at 05:40

## 2023-07-14 RX ADMIN — AMPICILLIN SODIUM AND SULBACTAM SODIUM 100 GRAM(S): 250; 125 INJECTION, POWDER, FOR SUSPENSION INTRAMUSCULAR; INTRAVENOUS at 00:08

## 2023-07-14 RX ADMIN — HYDROMORPHONE HYDROCHLORIDE 0.25 MILLIGRAM(S): 2 INJECTION INTRAMUSCULAR; INTRAVENOUS; SUBCUTANEOUS at 05:39

## 2023-07-14 RX ADMIN — Medication 1000 MILLIGRAM(S): at 07:30

## 2023-07-14 RX ADMIN — Medication 400 MILLIGRAM(S): at 07:14

## 2023-07-14 RX ADMIN — HYDROMORPHONE HYDROCHLORIDE 0.25 MILLIGRAM(S): 2 INJECTION INTRAMUSCULAR; INTRAVENOUS; SUBCUTANEOUS at 05:54

## 2023-07-14 RX ADMIN — CEFTRIAXONE 100 MILLIGRAM(S): 500 INJECTION, POWDER, FOR SOLUTION INTRAMUSCULAR; INTRAVENOUS at 15:56

## 2023-07-14 NOTE — DISCHARGE NOTE PROVIDER - NSDCFUADDINST_GEN_ALL_CORE_FT
Diet:    - Would recommend a low fiber diet to make smaller stools while you are healing.   Foods include:  Beans and legumes  Whole grains  Many raw vegetables and fruits or their juices  Fruit and vegetable skins  Nuts and seeds  The connective tissues of meats    General Discharge Instructions:  Please resume all regular home medications unless specifically advised not to take a particular medication. Also, please take any new medications as prescribed.  Please get plenty of rest, continue to ambulate several times per day, and drink adequate amounts of fluids. Avoid lifting weights greater than 5-10 lbs until you follow-up with your surgeon, who will instruct you further regarding activity restrictions.  Avoid driving or operating heavy machinery while taking pain medications.  Please follow-up with your surgeon and Primary Care Provider (PCP) as advised.  Incision Care:  *Please call your doctor or nurse practitioner if you have increased pain, swelling, redness, or drainage from the incision site.  *Avoid swimming and baths until your follow-up appointment.  *You may shower, and wash surgical incisions with a mild soap and warm water. Gently pat the area dry.  * You can line the area with guaze or cushion for your comfort.     Warning Signs:  Please call your doctor or nurse practitioner if you experience the following:  *You experience new chest pain, pressure, squeezing or tightness.  *New or worsening cough, shortness of breath, or wheeze.  *If you are vomiting and cannot keep down fluids or your medications.  *You are getting dehydrated due to continued vomiting, diarrhea, or other reasons. Signs of dehydration include dry mouth, rapid heartbeat, or feeling dizzy or faint when standing.  *You see blood or dark/black material when you vomit or have a bowel movement.  *You experience burning when you urinate, have blood in your urine, or experience a discharge.  *Your pain is not improving within 8-12 hours or is not gone within 24 hours. Call or return immediately if your pain is getting worse, changes location, or moves to your chest or back.  *You have shaking chills, or fever greater than 101.5 degrees Fahrenheit or 38 degrees Celsius.  *Any change in your symptoms, or any new symptoms that concern you.

## 2023-07-14 NOTE — BRIEF OPERATIVE NOTE - NSICDXBRIEFPOSTOP_GEN_ALL_CORE_FT
POST-OP DIAGNOSIS:  Perirectal abscess 14-Jul-2023 14:45:06  Soila Lopez   42 year old female with asthma, endometriosis, kidney stones, presented with lower back pain found to have obstructive uropathy with mild left hydro with 5mm stone at [facility] now s/p cystoscopy with retrograde pyelogram and left ureteral stent placement by Urology on 5/18/22, hospitalization complicated by fever to 104 and hypotension requiring SICU and pressors.

## 2023-07-14 NOTE — DISCHARGE NOTE PROVIDER - CARE PROVIDER_API CALL
Shaq Coronel  Colon/Rectal Surgery  [address] [place] [address], [address]  [place] [address]  Phone: [phone]  Fax: [phone]  Follow Up Time: 1 week

## 2023-07-14 NOTE — PROGRESS NOTE ADULT - SUBJECTIVE AND OBJECTIVE BOX
Team 1 Surgery Post-Op Note, PCN:     Pre-Op Dx: Perirectal Abscess  Procedure: Drainage, abscess, perirectal      Surgeon: Dr. Coronel    Subjective: Patient seen at bedside. He denies any pain but does not some mild intermittent "weird" feeling from the anesthesia.       Vital Signs Last 24 Hrs  T(C): 37.2 (14 Jul 2023 16:54), Max: 37.6 (13 Jul 2023 21:44)  T(F): 98.9 (14 Jul 2023 16:54), Max: 99.6 (13 Jul 2023 21:44)  HR: 89 (14 Jul 2023 16:54) (78 - 92)  BP: 155/83 (14 Jul 2023 16:54) (120/72 - 172/85)  BP(mean): 102 (14 Jul 2023 15:59) (102 - 109)  RR: 18 (14 Jul 2023 16:54) (13 - 18)  SpO2: 94% (14 Jul 2023 16:54) (94% - 99%)    Parameters below as of 14 Jul 2023 16:54  Patient On (Oxygen Delivery Method): room air        Physical Exam:  General: NAD, resting comfortably in bed  Pulmonary: Nonlabored breathing, no respiratory distress  Cardiovascular: NSR  Rectum: no drainage, no blood, no redness noted on the rectum or buttocks  Extremities: WWP, normal strength  Neuro: A/O x 3, CNs II-XII grossly intact, no focal deficits, normal sensation  Pulses: palpable distal pulses      LABS:                        12.3   9.74  )-----------( 171      ( 14 Jul 2023 05:30 )             36.2     07-14    137  |  103  |  13  ----------------------------<  99  4.0   |  24  |  0.87    Ca    8.8      14 Jul 2023 05:30  Phos  3.4     07-14  Mg     2.0     07-14    TPro  7.2  /  Alb  3.4  /  TBili  0.4  /  DBili  x   /  AST  24  /  ALT  30  /  AlkPhos  56  07-13    PT/INR - ( 13 Jul 2023 23:52 )   PT: 13.8 sec;   INR: 1.16          PTT - ( 13 Jul 2023 23:52 )  PTT:40.3 sec  CAPILLARY BLOOD GLUCOSE        Urinalysis Basic - ( 14 Jul 2023 05:30 )    Color: x / Appearance: x / SG: x / pH: x  Gluc: 99 mg/dL / Ketone: x  / Bili: x / Urobili: x   Blood: x / Protein: x / Nitrite: x   Leuk Esterase: x / RBC: x / WBC x   Sq Epi: x / Non Sq Epi: x / Bacteria: x      LIVER FUNCTIONS - ( 13 Jul 2023 16:54 )  Alb: 3.4 g/dL / Pro: 7.2 g/dL / ALK PHOS: 56 U/L / ALT: 30 U/L / AST: 24 U/L / GGT: x           ABO Interpretation: A (07-14 @ 09:33)        Radiology and Additional Studies:

## 2023-07-14 NOTE — DISCHARGE NOTE NURSING/CASE MANAGEMENT/SOCIAL WORK - PATIENT PORTAL LINK FT
You can access the FollowMyHealth Patient Portal offered by [facility] by registering at the following website: [email or web address]. By joining [facility]’s FollowMyHealth portal, you will also be able to view your health information using other applications (apps) compatible with our system.

## 2023-07-14 NOTE — BRIEF OPERATIVE NOTE - OPERATION/FINDINGS
GC swab sent x2. Identified left lateral area of fluctuance internally at about 8 cm. Bluntly entered cavity w/ extravasation of pus and sent for culture. Washed out with saline. Rigid proctoscopy revealed edematous mucosa, opening of abscess cavity, and mucosa otherwise healthy appearing. Cavity cleared out with blunt dissection, no additional tracking. No additional collections identified.

## 2023-07-14 NOTE — PROGRESS NOTE ADULT - SUBJECTIVE AND OBJECTIVE BOX
SUBJECTIVE:      MEDICATIONS  (STANDING):  acetaminophen   IVPB .. 1000 milliGRAM(s) IV Intermittent once  acetaminophen   IVPB .. 1000 milliGRAM(s) IV Intermittent once  ampicillin/sulbactam  IVPB      ampicillin/sulbactam  IVPB 1.5 Gram(s) IV Intermittent every 6 hours  heparin   Injectable 5000 Unit(s) SubCutaneous every 8 hours  lactated ringers. 1000 milliLiter(s) (120 mL/Hr) IV Continuous <Continuous>    MEDICATIONS  (PRN):  HYDROmorphone  Injectable 0.25 milliGRAM(s) IV Push every 6 hours PRN Moderate Pain (4 - 6)  HYDROmorphone  Injectable 0.5 milliGRAM(s) IV Push Once PRN Severe Pain (7 - 10)  ondansetron Injectable 4 milliGRAM(s) IV Push every 6 hours PRN Nausea and/or Vomiting      Vital Signs Last 24 Hrs  T(C): 37 (14 Jul 2023 05:53), Max: 37.6 (13 Jul 2023 16:11)  T(F): 98.6 (14 Jul 2023 05:53), Max: 99.7 (13 Jul 2023 16:11)  HR: 85 (14 Jul 2023 05:53) (82 - 92)  BP: 144/81 (14 Jul 2023 05:53) (142/71 - 172/85)  BP(mean): --  RR: 18 (14 Jul 2023 05:53) (18 - 18)  SpO2: 97% (14 Jul 2023 05:53) (96% - 99%)    Parameters below as of 14 Jul 2023 05:53  Patient On (Oxygen Delivery Method): room air        Physical Exam:  General: NAD, resting comfortably in bed  Pulmonary: Nonlabored breathing, no respiratory distress  Cardiovascular: NSR  Abdominal: soft, NT/ND  Extremities: WWP, normal strength  Neuro: A/O x 3, CNs II-XII grossly intact, no focal deficits, normal motor/sensation  Pulses: palpable distal pulses    I&O's Summary    13 Jul 2023 07:01  -  14 Jul 2023 06:20  --------------------------------------------------------  IN: 720 mL / OUT: 500 mL / NET: 220 mL        LABS:                        13.0   10.39 )-----------( 177      ( 13 Jul 2023 23:52 )             39.5     07-13    140  |  103  |  15  ----------------------------<  90  4.0   |  26  |  1.04    Ca    9.2      13 Jul 2023 23:52  Phos  3.7     07-13  Mg     2.1     07-13    TPro  7.2  /  Alb  3.4  /  TBili  0.4  /  DBili  x   /  AST  24  /  ALT  30  /  AlkPhos  56  07-13    PT/INR - ( 13 Jul 2023 23:52 )   PT: 13.8 sec;   INR: 1.16          PTT - ( 13 Jul 2023 23:52 )  PTT:40.3 sec  Urinalysis Basic - ( 13 Jul 2023 23:52 )    Color: x / Appearance: x / SG: x / pH: x  Gluc: 90 mg/dL / Ketone: x  / Bili: x / Urobili: x   Blood: x / Protein: x / Nitrite: x   Leuk Esterase: x / RBC: x / WBC x   Sq Epi: x / Non Sq Epi: x / Bacteria: x      CAPILLARY BLOOD GLUCOSE        LIVER FUNCTIONS - ( 13 Jul 2023 16:54 )  Alb: 3.4 g/dL / Pro: 7.2 g/dL / ALK PHOS: 56 U/L / ALT: 30 U/L / AST: 24 U/L / GGT: x             RADIOLOGY & ADDITIONAL STUDIES:

## 2023-07-14 NOTE — DISCHARGE NOTE PROVIDER - NSDCCPCAREPLAN_GEN_ALL_CORE_FT
PRINCIPAL DISCHARGE DIAGNOSIS  Diagnosis: Perirectal abscess  Assessment and Plan of Treatment:

## 2023-07-14 NOTE — PROGRESS NOTE ADULT - ASSESSMENT
62yo Male pt with PMHx HTN, HLD, known external hemorrhoids, PSHx of perianal fistula s/p fistulotomy (1987), lateral internal sphincterotomy for increased anal tone? (1994), who presents with a week and a half long hx of rectal pain and discharge. In the ED, VS wnl. On exam, R. posterior external hemorrhoids, Left lateral side wall with equisite ttp, light bleeding when palpated, with fissure? palpated. No discrete masses or lesions palpated. Labs significant for WBC 10.72, Hb 13. CRP 11. CTAP showing proctitis with a 3.5 x 2.6 x 2.4 cm mural abscess involving the left lateral wall. Findings c/w left lateral side wall perirectal abscess. S/p EUS perirectal abscess drainage 7/14    Plan:  - LRD / IVF  - pain/nausea control prn  - Unasyn IV  - SQH, SCDs  -D/C home if patient tolerates diet, urinates, and is clinically stable

## 2023-07-14 NOTE — PATIENT PROFILE ADULT - FALL HARM RISK - UNIVERSAL INTERVENTIONS
Bed in lowest position, wheels locked, appropriate side rails in place/Call bell, personal items and telephone in reach/Instruct patient to call for assistance before getting out of bed or chair/Non-slip footwear when patient is out of bed/[place] to call system/Physically safe environment - no spills, clutter or unnecessary equipment/Purposeful Proactive Rounding/Room/bathroom lighting operational, light cord in reach

## 2023-07-14 NOTE — DISCHARGE NOTE PROVIDER - HOSPITAL COURSE
64yo Male pt with PMHx HTN, HLD, known external hemorrhoids, PSHx of perianal fistula s/p fistulotomy (1987), lateral internal sphincterotomy for increased anal tone? (1994), who presented with a week and a half long hx of rectal pain and discharge. CTAP showed proctitis with a 3.5 x 2.6 x 2.4 cm mural abscess involving the left lateral wall. Findings c/w left lateral side wall perirectal abscess. On 7/14 patient underwent EUS perirectal abscess drainage. His postoperative course was unremarkable with advancement of diet, passing trial of void, and pain control. On day of discharge patient was stable to be d/c'd home.

## 2023-07-17 DIAGNOSIS — I10 ESSENTIAL (PRIMARY) HYPERTENSION: ICD-10-CM

## 2023-07-17 DIAGNOSIS — K62.89 OTHER SPECIFIED DISEASES OF ANUS AND RECTUM: ICD-10-CM

## 2023-07-17 DIAGNOSIS — K57.90 DIVERTICULOSIS OF INTESTINE, PART UNSPECIFIED, WITHOUT PERFORATION OR ABSCESS WITHOUT BLEEDING: ICD-10-CM

## 2023-07-17 DIAGNOSIS — D72.829 ELEVATED WHITE BLOOD CELL COUNT, UNSPECIFIED: ICD-10-CM

## 2023-07-17 DIAGNOSIS — R50.9 FEVER, UNSPECIFIED: ICD-10-CM

## 2023-07-17 DIAGNOSIS — E78.5 HYPERLIPIDEMIA, UNSPECIFIED: ICD-10-CM

## 2023-07-17 DIAGNOSIS — K61.1 RECTAL ABSCESS: ICD-10-CM

## 2023-07-17 DIAGNOSIS — Z79.82 LONG TERM (CURRENT) USE OF ASPIRIN: ICD-10-CM

## 2023-07-17 DIAGNOSIS — K64.9 UNSPECIFIED HEMORRHOIDS: ICD-10-CM

## 2023-07-18 LAB
CULTURE RESULTS: SIGNIFICANT CHANGE UP
CULTURE RESULTS: SIGNIFICANT CHANGE UP
SPECIMEN SOURCE: SIGNIFICANT CHANGE UP
SPECIMEN SOURCE: SIGNIFICANT CHANGE UP

## 2023-07-19 LAB
CULTURE RESULTS: SIGNIFICANT CHANGE UP
METHOD TYPE: SIGNIFICANT CHANGE UP
ORGANISM # SPEC MICROSCOPIC CNT: SIGNIFICANT CHANGE UP
ORGANISM # SPEC MICROSCOPIC CNT: SIGNIFICANT CHANGE UP
SPECIMEN SOURCE: SIGNIFICANT CHANGE UP

## 2023-07-20 DIAGNOSIS — F12.90 CANNABIS USE, UNSPECIFIED, UNCOMPLICATED: ICD-10-CM

## 2023-07-20 DIAGNOSIS — Z79.82 LONG TERM (CURRENT) USE OF ASPIRIN: ICD-10-CM

## 2023-07-20 DIAGNOSIS — I10 ESSENTIAL (PRIMARY) HYPERTENSION: ICD-10-CM

## 2023-07-20 DIAGNOSIS — K61.1 RECTAL ABSCESS: ICD-10-CM

## 2023-07-20 DIAGNOSIS — K64.4 RESIDUAL HEMORRHOIDAL SKIN TAGS: ICD-10-CM

## 2023-07-20 DIAGNOSIS — K62.89 OTHER SPECIFIED DISEASES OF ANUS AND RECTUM: ICD-10-CM

## 2023-07-20 DIAGNOSIS — E78.5 HYPERLIPIDEMIA, UNSPECIFIED: ICD-10-CM

## 2023-08-17 PROBLEM — I10 ESSENTIAL (PRIMARY) HYPERTENSION: Chronic | Status: ACTIVE | Noted: 2023-07-13

## 2023-08-24 ENCOUNTER — APPOINTMENT (OUTPATIENT)
Dept: COLORECTAL SURGERY | Facility: CLINIC | Age: 63
End: 2023-08-24
Payer: COMMERCIAL

## 2023-08-24 VITALS
WEIGHT: 173 LBS | HEIGHT: 70 IN | BODY MASS INDEX: 24.77 KG/M2 | DIASTOLIC BLOOD PRESSURE: 92 MMHG | OXYGEN SATURATION: 98 % | RESPIRATION RATE: 14 BRPM | SYSTOLIC BLOOD PRESSURE: 153 MMHG | HEART RATE: 85 BPM

## 2023-08-24 PROCEDURE — 99024 POSTOP FOLLOW-UP VISIT: CPT

## 2023-08-24 NOTE — PLAN
[TextEntry] : - RTC in 1 month for re-evaluation and anorectal exam. - If levator spasming not improved at that point and unable to complete thorough examination will consider EUA.

## 2023-08-24 NOTE — HISTORY OF PRESENT ILLNESS
[FreeTextEntry1] : 63M with a previous hx of anorectal abscess/fistula s/p fistulotomy, anal fissure s/p lateral internal sphincterotomy, with recent hx of recurrent anorectal abscess s/p EUA with submucosal drainage.  Pt notes that overall he has no residual pain however he will experience sphincter spasming after a particularly difficult passage of bowel movement. Otherwise denies purulent drainage, resting pain, incontinence, swelling, bleeding or pruritus.

## 2023-08-24 NOTE — PHYSICAL EXAM
[Abdomen Masses] : No abdominal masses [Abdomen Tenderness] : ~T No ~M abdominal tenderness [JVD] : no jugular venous distention  [Normal Breath Sounds] : Normal breath sounds [Normal Heart Sounds] : normal heart sounds [No Rash or Lesion] : No rash or lesion [Alert] : alert [Calm] : calm [de-identified] : Well appearing male in NAD [de-identified] : MMM [de-identified] : ROM WNL [FreeTextEntry1] : The pt was examined in the prone makeda-knife position. Visual examination of the anal verge with effacement of the buttocks revealed no masses, ulcerations, fissures or skin rashes. Digital rectal exam revealed pain invoked with sphincter palpation and some scarring in the LA quadrant just proximal to the anorectal ring. Otherwise no palpable mass, fluctuance or blood with sphincter tone upper normal limits. A lubricated anoscope was then inserted revealing healthy appearing anoderm with three appropriately sized, noninflamed internal hemorrhoids.  Distal rectal mucosa was not well visualized due to discomfort with the exam.  The patient tolerated the exam well.

## 2023-09-21 ENCOUNTER — APPOINTMENT (OUTPATIENT)
Dept: COLORECTAL SURGERY | Facility: CLINIC | Age: 63
End: 2023-09-21

## 2023-09-28 ENCOUNTER — APPOINTMENT (OUTPATIENT)
Dept: COLORECTAL SURGERY | Facility: CLINIC | Age: 63
End: 2023-09-28
Payer: COMMERCIAL

## 2023-09-28 VITALS
DIASTOLIC BLOOD PRESSURE: 70 MMHG | RESPIRATION RATE: 13 BRPM | HEART RATE: 92 BPM | SYSTOLIC BLOOD PRESSURE: 135 MMHG | OXYGEN SATURATION: 98 %

## 2023-09-28 DIAGNOSIS — K61.2 ANORECTAL ABSCESS: ICD-10-CM

## 2023-09-28 PROCEDURE — 45300 PROCTOSIGMOIDOSCOPY DX: CPT | Mod: 58

## 2023-10-17 NOTE — ED ADULT NURSE NOTE - NSICDXPASTMEDICALHX_GEN_ALL_CORE_FT
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

## 2023-11-09 ENCOUNTER — APPOINTMENT (OUTPATIENT)
Dept: COLORECTAL SURGERY | Facility: CLINIC | Age: 63
End: 2023-11-09

## 2024-01-05 NOTE — ED ADULT NURSE NOTE - OBJECTIVE STATEMENT
SEE BELOW FOR OUR NEW PHONE NUMBER!!!      Thanks for visiting us today!    Remember these important phone numbers:    [phone] for phone nurses during the day and our nurse answering service at night    [phone]   for scheduling or changing future appointments    [phone] for the Poison Control Center    When leaving a message for our staff, please include:   the spelling of your child’s full name and date of birth  your full name and relationship to child  best phone number and time to reach you   reason for the call      We strongly recommend that all children age 6 months and older receive the COVID-19 vaccine. Call our office at [phone]  to schedule.      Where's the best place on the internet to look up health information about kids?  [email or web address]  From the American Academy of Pediatrics        Is your child signed up for [employer]? If you do this, you can message us rather than playing phone tag! You can also look at labs, pay your bills, and do some scheduling. Go to your own account first. (If you don't have one yet, you can set one up at the website below or at your doctor's office).  Using a web browser (not the phone aundrea), on the right hand side of your page, click the button marked \"Request Access to my Child's Records.\" Fill out the information. In a few days your child's information will be linked to your account. It's that simple!! Here is the website for more information:    [email or web address]        --------------------------------------------------------------------------------------------------------------------    Healthy Habits:    No drugs/alcohol/smoke with breastfeeding  No second-hand smoke  Avoid sun, use sunscreen liberally  Regular bedtime routine - should be sleeping through night  Teething: Teether/cold objects, clean gums with soft cloth. Once first teeth erupt, gently brush using a soft baby toothbrush and a smear (no bigger than a grain of rice) of  fluoride toothpaste    Diet:    Breast milk or formula with iron is still the main source of calories  Supplemental vitamin D recommended; consider vitamin w/iron if breastfeeding   Don't microwave or prop bottles, no bottles in bed   Limit juice <4 oz/day  No honey until age 1 year  Rye crisp/jorge toast/mashed mauricio foods/cereal, cup with water. No nuts, hot dogs, popcorn or hard candy. Allow to self-feed  Avoid feeding or playing during the night    Injury Prevention:    Car seat safety: facing to the rear until child reaches upper rear-facing size limit on seat, properly secured  Crib safety: no soft bedding or bumper pads, no co-sleeping, lower crib mattress, remove mobiles  Avoid plastic bags, water temperature <120 degrees F, poison control number: [phone], smoke detectors, learn CPR, beware of choking hazards  Child-proof home: No walkers, beware of burns - keep hot liquids out of child's grasp range, toy size appropriate, childproof home, watch for falls, cover outlets, hanging hazards    Family Interaction:    Hold, cuddle, rock, talk, sing, play, read.   Avoid TV  Sibling rivalry is normal   No punishment (distraction, remove from danger)     Other:   Illness: Fever is good - our bodies fight infection better when having a fever.  Child should be encouraged to take extra fluids, dress child in light clothing, avoid over bundling  Pacifier use ok, tub bathing every 2-3 days  Separation anxiety    Immunizations:  Immunization status was reviewed today:    COVID-19 vaccine given today   60 yo M presents to ed c.o diarrhea and bloating for 4 days. Pt states he was recently diagnosed with diverticuli and instructed to come to r/o diverticulitis. Pt deniesf/chills/n/v/d

## 2024-01-09 ENCOUNTER — APPOINTMENT (OUTPATIENT)
Dept: COLORECTAL SURGERY | Facility: CLINIC | Age: 64
End: 2024-01-09
Payer: COMMERCIAL

## 2024-01-09 VITALS
HEART RATE: 86 BPM | DIASTOLIC BLOOD PRESSURE: 87 MMHG | HEIGHT: 70 IN | TEMPERATURE: 97.8 F | WEIGHT: 175 LBS | OXYGEN SATURATION: 99 % | BODY MASS INDEX: 25.05 KG/M2 | SYSTOLIC BLOOD PRESSURE: 133 MMHG | RESPIRATION RATE: 14 BRPM

## 2024-01-09 DIAGNOSIS — K62.9 DISEASE OF ANUS AND RECTUM, UNSPECIFIED: ICD-10-CM

## 2024-01-09 PROCEDURE — 46600 DIAGNOSTIC ANOSCOPY SPX: CPT

## 2024-01-09 PROCEDURE — 99024 POSTOP FOLLOW-UP VISIT: CPT

## 2024-01-26 ENCOUNTER — NON-APPOINTMENT (OUTPATIENT)
Age: 64
End: 2024-01-26

## 2024-01-26 ENCOUNTER — APPOINTMENT (OUTPATIENT)
Dept: INTERNAL MEDICINE | Facility: CLINIC | Age: 64
End: 2024-01-26
Payer: COMMERCIAL

## 2024-01-26 VITALS
HEIGHT: 70 IN | RESPIRATION RATE: 16 BRPM | WEIGHT: 175 LBS | SYSTOLIC BLOOD PRESSURE: 150 MMHG | HEART RATE: 88 BPM | BODY MASS INDEX: 25.05 KG/M2 | DIASTOLIC BLOOD PRESSURE: 90 MMHG | TEMPERATURE: 97.7 F | OXYGEN SATURATION: 99 %

## 2024-01-26 VITALS — DIASTOLIC BLOOD PRESSURE: 85 MMHG | SYSTOLIC BLOOD PRESSURE: 132 MMHG

## 2024-01-26 DIAGNOSIS — Z01.818 ENCOUNTER FOR OTHER PREPROCEDURAL EXAMINATION: ICD-10-CM

## 2024-01-26 DIAGNOSIS — I10 ESSENTIAL (PRIMARY) HYPERTENSION: ICD-10-CM

## 2024-01-26 DIAGNOSIS — Z84.89 FAMILY HISTORY OF OTHER SPECIFIED CONDITIONS: ICD-10-CM

## 2024-01-26 DIAGNOSIS — E78.5 HYPERLIPIDEMIA, UNSPECIFIED: ICD-10-CM

## 2024-01-26 PROCEDURE — 93000 ELECTROCARDIOGRAM COMPLETE: CPT

## 2024-01-26 PROCEDURE — 99204 OFFICE O/P NEW MOD 45 MIN: CPT

## 2024-01-26 RX ORDER — LOSARTAN POTASSIUM 100 MG/1
100 TABLET, FILM COATED ORAL
Refills: 0 | Status: ACTIVE | COMMUNITY

## 2024-01-26 NOTE — PHYSICAL EXAM
[No Acute Distress] : no acute distress [Well-Appearing] : well-appearing [Normal Sclera/Conjunctiva] : normal sclera/conjunctiva [Normal Outer Ear/Nose] : the outer ears and nose were normal in appearance [No Edema] : there was no peripheral edema [Normal] : no rash

## 2024-01-26 NOTE — HISTORY OF PRESENT ILLNESS
[No Pertinent Cardiac History] : no history of aortic stenosis, atrial fibrillation, coronary artery disease, recent myocardial infarction, or implantable device/pacemaker [No Pertinent Pulmonary History] : no history of asthma, COPD, sleep apnea, or smoking [(Patient denies any chest pain, claudication, dyspnea on exertion, orthopnea, palpitations or syncope)] : Patient denies any chest pain, claudication, dyspnea on exertion, orthopnea, palpitations or syncope [Good (7-10 METs)] : Good (7-10 METs) [Self] : no previous adverse anesthesia reaction [Chronic Anticoagulation] : no chronic anticoagulation [Chronic Kidney Disease] : no chronic kidney disease [Diabetes] : no diabetes [FreeTextEntry1] : anorectal exam under anesthesia with biopsy [FreeTextEntry2] : 2/9/24 [FreeTextEntry3] : Dr. Coronel [FreeTextEntry4] : Mr. MONTY DICKERSON is a 63-year-old male with history of HTN, HLD, external hemorrhoids, perianal fistula s/p fistulotomy (1987), lateral internal sphincterotomy (1994), left lateral side wall perirectal abscess s/p perirectal abscess drainage (July '23) presenting today to the [facility] prior to rectal exam/biopsy under anesthesia. [FreeTextEntry8] : biking 80 miles a week

## 2024-01-26 NOTE — ASSESSMENT
[Patient Optimized for Surgery] : Patient optimized for surgery [No Further Testing Recommended] : no further testing recommended [FreeTextEntry4] : Mr. MONTY DICKERSON is a 63-year-old male with history of HTN, HLD, external hemorrhoids, perianal fistula s/p fistulotomy (1987), lateral internal sphincterotomy (1994), left lateral side wall perirectal abscess s/p perirectal abscess drainage (July '23) presenting today to the [facility] prior to rectal exam/biopsy under anesthesia.  ECG obtained today without ischemic changes and good exercise tolerance. He is considered low risk for a low risk procedure.

## 2024-02-08 ENCOUNTER — TRANSCRIPTION ENCOUNTER (OUTPATIENT)
Age: 64
End: 2024-02-08

## 2024-02-08 NOTE — ASU PATIENT PROFILE, ADULT - NS PREOP UNDERSTANDS INFO
Spoke to patient to be NPO/NO solid foods after  2200 pm tonight, fallow up MD advise  ,  alow to drink water till  12-2 am , dress comfortable,  leave all valuable at home,  escort arranged, address and telephone given to patient/yes

## 2024-02-09 ENCOUNTER — TRANSCRIPTION ENCOUNTER (OUTPATIENT)
Age: 64
End: 2024-02-09

## 2024-02-09 ENCOUNTER — OUTPATIENT (OUTPATIENT)
Dept: OUTPATIENT SERVICES | Facility: HOSPITAL | Age: 64
LOS: 1 days | Discharge: HOME | End: 2024-02-09
Payer: COMMERCIAL

## 2024-02-09 ENCOUNTER — RESULT REVIEW (OUTPATIENT)
Age: 64
End: 2024-02-09

## 2024-02-09 ENCOUNTER — APPOINTMENT (OUTPATIENT)
Dept: COLORECTAL SURGERY | Facility: CLINIC | Age: 64
End: 2024-02-09

## 2024-02-09 VITALS
TEMPERATURE: 97 F | DIASTOLIC BLOOD PRESSURE: 80 MMHG | OXYGEN SATURATION: 98 % | RESPIRATION RATE: 15 BRPM | SYSTOLIC BLOOD PRESSURE: 131 MMHG | HEART RATE: 76 BPM

## 2024-02-09 VITALS
OXYGEN SATURATION: 98 % | DIASTOLIC BLOOD PRESSURE: 86 MMHG | HEART RATE: 89 BPM | TEMPERATURE: 98 F | HEIGHT: 70 IN | WEIGHT: 167.55 LBS | RESPIRATION RATE: 16 BRPM | SYSTOLIC BLOOD PRESSURE: 122 MMHG

## 2024-02-09 DIAGNOSIS — Z98.890 OTHER SPECIFIED POSTPROCEDURAL STATES: Chronic | ICD-10-CM

## 2024-02-09 DIAGNOSIS — K60.3 ANAL FISTULA: Chronic | ICD-10-CM

## 2024-02-09 PROCEDURE — 88305 TISSUE EXAM BY PATHOLOGIST: CPT | Mod: 26

## 2024-02-09 PROCEDURE — 45171 EXC RECT TUM TRANSANAL PART: CPT | Mod: GC

## 2024-02-09 DEVICE — SURGICEL FIBRILLAR 1 X 2": Type: IMPLANTABLE DEVICE | Status: FUNCTIONAL

## 2024-02-09 RX ORDER — DOCUSATE SODIUM 100 MG
1 CAPSULE ORAL
Qty: 15 | Refills: 0
Start: 2024-02-09

## 2024-02-09 RX ORDER — FENTANYL CITRATE 50 UG/ML
25 INJECTION INTRAVENOUS
Refills: 0 | Status: DISCONTINUED | OUTPATIENT
Start: 2024-02-09 | End: 2024-02-09

## 2024-02-09 RX ORDER — ROSUVASTATIN CALCIUM 5 MG/1
1 TABLET ORAL
Refills: 0 | DISCHARGE

## 2024-02-09 RX ORDER — OXYCODONE HYDROCHLORIDE 5 MG/1
1 TABLET ORAL
Qty: 5 | Refills: 0
Start: 2024-02-09

## 2024-02-09 RX ORDER — ACETAMINOPHEN 500 MG
1000 TABLET ORAL ONCE
Refills: 0 | Status: COMPLETED | OUTPATIENT
Start: 2024-02-09 | End: 2024-02-09

## 2024-02-09 RX ORDER — LOSARTAN POTASSIUM 100 MG/1
1 TABLET, FILM COATED ORAL
Refills: 0 | DISCHARGE

## 2024-02-09 RX ORDER — SODIUM CHLORIDE 9 MG/ML
1000 INJECTION, SOLUTION INTRAVENOUS
Refills: 0 | Status: DISCONTINUED | OUTPATIENT
Start: 2024-02-09 | End: 2024-02-09

## 2024-02-09 RX ADMIN — Medication 1000 MILLIGRAM(S): at 14:02

## 2024-02-09 RX ADMIN — Medication 400 MILLIGRAM(S): at 13:32

## 2024-02-09 NOTE — ASU PREOP CHECKLIST - ALLERGY BAND ON
Dr Diamond Ortiz Juan Chapman fax # [phone]. Cm to follow. no known allergies

## 2024-02-09 NOTE — BRIEF OPERATIVE NOTE - OPERATION/FINDINGS
Prone jackknife position. Field block performed. Anoscopy performed with finding of left lateral polyp approximately 4cm proximal to anal verge. Biopsy taken and hemostasis achieved with cautery. Small band of hypertrophied tissue transected with cautery. Hemostasis achieved. No other abnormal findings. Wound packed with fibrillar.

## 2024-02-09 NOTE — ASU DISCHARGE PLAN (ADULT/PEDIATRIC) - ASU DC SPECIAL INSTRUCTIONSFT
Follow up with Dr. Coronel in 2-3 weeks. Call the office at the number below to schedule your appointment. You may shower; soap and water over incision sites. Do not scrub. Pat dry when done. No tub bathing or swimming until cleared.  Ambulate as tolerated, but no heavy lifting (>10lbs) or strenuous exercise. You may resume regular diet. You should be urinating at least 3-4x per day. Call the office if you experience increasing abdominal pain, nausea, vomiting, or temperature >101 F.    Please take Sitz baths after bowel movements daily.    Please take Tylenol 650mg every six hours as needed for pain. For pain not controlled with Tylenol, please take Oxycodone 5mg every six hours, as needed. On days which you take Oxycodone, please also take Colace 100mg capsule three times daily to prevent constipation.

## 2024-02-09 NOTE — ASU PREOP CHECKLIST - NSBLOODTRANS_GEN_A_CORE_SIUH
----- Message from Vanessa Reagan sent at 3/20/2019  4:13 PM CDT -----  Type:  Patient Returning Call    Who Called: wife- Cailin Mac   Who Left Message for Patient:  Irma  Does the patient know what this is regarding?:  yes  Best Call Back Number:  [phone] (home)     Additional Information:  Na    
no...

## 2024-02-12 LAB — SURGICAL PATHOLOGY STUDY: SIGNIFICANT CHANGE UP

## 2024-05-29 NOTE — ED POST DISCHARGE NOTE - NS ED POST DC CALL 2
Ambulatory Care Coordination Note  5/29/2024    Outreach for High Risk Case Management - spoke with patient  Tomorrow will daiana a week since Endocrinology appointment with direction to NOT use correction insulin  Patient reports BS this morning was 356    - BS was dropping last night (70) and she ate popcorn and drank a sprite   - Currently BW is 227   - two mornings ago BS was 120.  States she is feeling well, really focused on BS control and told the neurologist this.  Per patient, neuro emphasized need for glycemic control or she will lose use of her hand(s).  (See EMG results)    PLAN:  route note to Endo  Call in one week.    Method of communication with provider: chart routing.    ACM: Heather Redman, RN    Care Coordination Interventions    Referral from Primary Care Provider: No  Suggested Interventions and Community Resources  Diabetes Education: In Process           Future Appointments   Date Time Provider Department Center   6/3/2024  2:20 PM Luke Dawson,  [facility]   8/30/2024  1:00 PM Anna Hoskins PA-C [facility]       Attempted, left message

## (undated) DEVICE — PACK MINOR

## (undated) DEVICE — ELCTR BOVIE TIP BLADE INSULATED 2.8" EDGE WITH SAFETY

## (undated) DEVICE — KIT SIGMOIDOSCOPE NO SWAB SGL USE

## (undated) DEVICE — ONETRAC LIGHTED RETRACTOR 135 X 30MM DISP

## (undated) DEVICE — VENODYNE/SCD SLEEVE CALF MEDIUM

## (undated) DEVICE — SYR ASEPTO

## (undated) DEVICE — NDL HYPO SAFE 22G X 1.5" (BLACK)

## (undated) DEVICE — PREP BETADINE SPONGE STICKS

## (undated) DEVICE — DRAPE 1/2 SHEET 40X57"

## (undated) DEVICE — VESSEL LOOP MAXI-WHITE 0.120" X 16"

## (undated) DEVICE — SUT SILK 0 30" SH

## (undated) DEVICE — GOWN ROYAL SILK XL

## (undated) DEVICE — DRSG TELFA 3 X 8

## (undated) DEVICE — ELCTR BOVIE PENCIL SMOKE EVACUATION

## (undated) DEVICE — PREP BETADINE KIT

## (undated) DEVICE — DRAPE TOWEL BLUE 17" X 24"

## (undated) DEVICE — GLV 7.5 PROTEXIS (WHITE)

## (undated) DEVICE — PACK COLO RECTAL

## (undated) DEVICE — ELCTR PENCIL SMOKE EVACUATOR COATED PUSH BUTTON 70MM

## (undated) DEVICE — LUBRICATING JELLY ONESHOT 1.25OZ

## (undated) DEVICE — WARMING BLANKET UPPER ADULT

## (undated) DEVICE — DRAPE MEDIUM SHEET 44" X 70"